# Patient Record
Sex: FEMALE | Race: BLACK OR AFRICAN AMERICAN | NOT HISPANIC OR LATINO | URBAN - METROPOLITAN AREA
[De-identification: names, ages, dates, MRNs, and addresses within clinical notes are randomized per-mention and may not be internally consistent; named-entity substitution may affect disease eponyms.]

---
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
## 2025-02-14 ENCOUNTER — OFFICE VISIT (OUTPATIENT)
Dept: OBGYN CLINIC | Age: 20
End: 2025-02-14
Payer: COMMERCIAL

## 2025-02-14 VITALS
BODY MASS INDEX: 19.63 KG/M2 | DIASTOLIC BLOOD PRESSURE: 62 MMHG | WEIGHT: 129.5 LBS | HEIGHT: 68 IN | SYSTOLIC BLOOD PRESSURE: 110 MMHG

## 2025-02-14 DIAGNOSIS — Z78.9 USES HORMONAL CONTRACEPTIVE PATCH AS PRIMARY BIRTH CONTROL METHOD: ICD-10-CM

## 2025-02-14 DIAGNOSIS — Z11.3 SCREEN FOR STD (SEXUALLY TRANSMITTED DISEASE): ICD-10-CM

## 2025-02-14 DIAGNOSIS — Z01.419 WELL WOMAN EXAM: Primary | ICD-10-CM

## 2025-02-14 DIAGNOSIS — Z12.4 SCREENING FOR CERVICAL CANCER: ICD-10-CM

## 2025-02-14 PROCEDURE — 99385 PREV VISIT NEW AGE 18-39: CPT | Performed by: NURSE PRACTITIONER

## 2025-02-14 RX ORDER — NORELGESTROMIN AND ETHINYL ESTRADIOL 35; 150 UG/MG; UG/MG
PATCH TRANSDERMAL
Qty: 3 PATCH | Refills: 4 | Status: SHIPPED | OUTPATIENT
Start: 2025-02-14

## 2025-02-14 RX ORDER — DOXYCYCLINE 100 MG/1
CAPSULE ORAL
COMMUNITY
End: 2025-02-14

## 2025-02-14 RX ORDER — AZITHROMYCIN 250 MG/1
TABLET, FILM COATED ORAL
COMMUNITY
Start: 2024-11-14 | End: 2025-02-14

## 2025-02-14 RX ORDER — IRON FUM,PS/FOLIC/BCOMP,C NO.9 125 MG-1MG
1 CAPSULE ORAL DAILY
COMMUNITY
Start: 2025-02-06

## 2025-02-14 NOTE — PROGRESS NOTES
ddThe patient is a 20 y.o. No obstetric history on file. who is seen for heavy vaginal bleeding.     Menses frequency:  Menses duration:  Menses heavy (if yes, how many pads/tampons on heaviest days)?  Menses painful (if yes, anything help the pain- ibuprofen, heating pads)?  Previous work up (ultrasound, labs, etc):  Previous treatments (OCPs, IUD, etc.), did they work?:'    HISTORY:    No obstetric history on file.  No LMP recorded.  Sexual History:  {Sexual Hx:5148}  Contraception:  {Contraception Methods:5051}  Current Outpatient Medications on File Prior to Visit   Medication Sig Dispense Refill    albuterol (PROVENTIL) (2.5 MG/3ML) 0.083% nebulizer solution USE 3 MLS IN NEBULIZER EVERY 4 TO 6 HOURS FOR 7 DAYS AS NEEDED FOR COUGHING/WHEEZING.      albuterol sulfate HFA (VENTOLIN HFA) 108 (90 Base) MCG/ACT inhaler INHALE 2 PUFFS EVERY 4 TO 6 HOURS AS NEEDED      Benzoyl Peroxide (BENZAC AC) 10 % external wash Wash face bid; dry x 20 minutes.      Calcium Carbonate Antacid 400 MG CHEW Take 400 mg by mouth 2 times daily      cetirizine (ZYRTEC) 10 MG tablet Take 10 mg by mouth daily      vitamin D3 (CHOLECALCIFEROL) 10 MCG (400 UNIT) TABS tablet Take by mouth daily      diphenhydrAMINE (BENADRYL) 12.5 MG/5ML elixir Take 25 mg by mouth every 6 hours as needed      EPINEPHrine (EPIPEN) 0.3 MG/0.3ML SOAJ injection INJECT 0.3 ML BY INTRAMUSCULAR ROUTE ONCE AS NEEDED FOR ANAPHYLAXIS FOR UP TO 1 DOSE.      ergocalciferol (ERGOCALCIFEROL) 1.25 MG (32142 UT) capsule Take 50,000 Units by mouth      erythromycin with ethanol (EMGEL) 2 % gel Apply topically 2 times daily      fluticasone (FLONASE) 50 MCG/ACT nasal spray 2 sprays by Nasal route daily      Melatonin 2.5 MG CHEW I tab 1 hour before bedtime.      minocycline (MINOCIN;DYNACIN) 100 MG capsule TAKE ONE CAPSULE BY MOUTH EVERY DAY WITH FOOD      naproxen sodium (ANAPROX) 550 MG tablet Take 550 mg by mouth 2 times daily (with meals)      propranolol (INDERAL) 10 MG 
sodium (ANAPROX) 550 MG tablet Take 550 mg by mouth 2 times daily (with meals)        propranolol (INDERAL) 10 MG tablet Take 10 mg by mouth 2 times daily        sertraline (ZOLOFT) 50 MG tablet Take 50 mg by mouth daily        tretinoin (RETIN-A) 0.05 % cream APPLY EVERY EVENING TO ACNE          No current facility-administered medications on file prior to visit.            ROS:  Feeling well. No dyspnea or chest pain on exertion.  No abdominal pain, change in bowel habits, black or bloody stools.  No urinary tract symptoms. GYN ROS: {gyn ros:732096}.     PHYSICAL EXAM:  There were no vitals taken for this visit.     The patient appears well, alert, oriented x 3, in no distress.  Lungs are clear. Heart RRR, no murmurs. Abdomen soft without tenderness, guarding, mass or organomegaly.  Pelvic: {pelvic exam:460132::\"normal external genitalia, vulva, vagina, cervix, uterus and adnexa\"}.     ASSESSMENT:  No diagnosis found.     PLAN:  {Gyn plan:70344}  No orders of the defined types were placed in this encounter.           Supervising physician is  ****.  *** minutes spent in chart review, exam, counseling and documentation.   
normal peripheral pulses.   Neurological is normal, no focal findings.    BREAST EXAM: breasts appear normal, no suspicious masses, no skin or nipple changes or axillary nodes, risk and benefit of breast self-exam was discussed    PELVIC EXAM: VULVA: normal appearing vulva with no masses, tenderness or lesions, VAGINA: normal appearing vagina with normal color and discharge, no lesions, CERVIX: normal appearing cervix without discharge or lesions, UTERUS: uterus is normal size, shape, consistency and nontender, ADNEXA: normal adnexa in size, nontender and no masses    Assessment/Plan:     1. Uses hormonal contraceptive patch as primary birth control method    - norelgestromin-ethinyl estradiol (XULANE) 150-35 MCG/24HR; Place 1 patch onto the skin once a week for 3 weeks, no patch on week 4 to have menses, resume patches after menses.  Dispense: 3 patch; Refill: 4    2. Well woman exam      3. Screening for cervical cancer    - PAP IG, CT-NG-TV, rfx Aptima HPV ASCUS (199325); Future  - PAP IG, CT-NG-TV, rfx Aptima HPV ASCUS (199325)    4. Screen for STD (sexually transmitted disease)    - PAP IG, CT-NG-TV, rfx Aptima HPV ASCUS (199325); Future  - PAP IG, CT-NG-TV, rfx Aptima HPV ASCUS (199325)           Start ortho evra patches  Counseled pt on OCP use. Discussed SE to include BTB, nausea, weight gain and risks to include VTE, stroke, cardiac event. Pt with no contraindications to OCPs.   pap smear  return annually or prn    Supervising physician is Dr. Gaona.    Janessa Borrero, APRN - CNP

## 2025-02-25 LAB
C TRACH RRNA CVX QL NAA+PROBE: NEGATIVE
COLLECTION METHOD: NORMAL
CYTOLOGIST CVX/VAG CYTO: NORMAL
CYTOLOGY CVX/VAG DOC THIN PREP: NORMAL
HPV REFLEX: NORMAL
Lab: NORMAL
N GONORRHOEA RRNA CVX QL NAA+PROBE: NEGATIVE
PAP SOURCE: NORMAL
PATH REPORT.FINAL DX SPEC: NORMAL
STAT OF ADQ CVX/VAG CYTO-IMP: NORMAL
T VAGINALIS RRNA SPEC QL NAA+PROBE: NEGATIVE

## 2025-04-08 NOTE — PROGRESS NOTES
Patient presents today for a routine gynecological examination with no complaints. Would like to start on OCP.  Pt reports her BP has been \"fluctuating\". She is followed by [facility] medicine for PCP for the above.    She has been on OCP in the past but states she had BTB on them.   Wishes to start ocp.   Will start POP.       OB History          0    Para   0    Term   0       0    AB   0    Living   0         SAB   0    IAB   0    Ectopic   0    Molar   0    Multiple   0    Live Births   0              Last pap: Never    GYN History           Patient's last menstrual period was 2025 (approximate). Cycle Length 28 Lasting 7  positive dysmenorrhea; negative postcoital bleeding    Past Medical History:  Past Medical History:   Diagnosis Date    Anxiety        Past Surgical History:  Past Surgical History:   Procedure Laterality Date    WISDOM TOOTH EXTRACTION         Allergies:   Allergies   Allergen Reactions    Amoxicillin-Pot Clavulanate Rash    Cefdinir Rash    Penicillins Hives and Rash       Medication History:  Current Outpatient Medications   Medication Sig Dispense Refill    albuterol sulfate  (90 Base) MCG/ACT inhaler Take 2 puffs q 4-6 hrs x 7 days prn cough/wheeze      albuterol (PROVENTIL) (2.5 MG/3ML) 0.083% nebulizer solution 3 ml q 4-6 hrs x 7 days prn for coughing/wheezing.      Benzoyl Peroxide (BENZAC AC) 10 % external wash Wash the face 2 times per day      Calcium Carbonate Antacid 400 MG CHEW Take 400 mg by mouth 2 times daily      cetirizine (ZYRTEC) 10 MG tablet Take 1 tablet by mouth daily      cromolyn (OPTICROM) 4 % ophthalmic solution Apply 1 drop to eye 4 times daily      diphenhydrAMINE (BANOPHEN) 12.5 MG/5ML liquid TAKE 5 ML BY MOUTH EVERY SIX (6) HOURS AS NEEDED (ANAPHYLAXIS).      EPINEPHrine (EPIPEN) 0.3 MG/0.3ML SOAJ injection 0.3 ML BY INTRAMUSCULAR ROUTE ONCE AS NEEDED FOR ANAPHYLAXIS FOR UP TO 1 DOSE.      ergocalciferol (ERGOCALCIFEROL) 1.25 MG

## 2025-04-09 ENCOUNTER — OFFICE VISIT (OUTPATIENT)
Dept: OBGYN CLINIC | Age: 20
End: 2025-04-09
Payer: COMMERCIAL

## 2025-04-09 VITALS
SYSTOLIC BLOOD PRESSURE: 130 MMHG | BODY MASS INDEX: 23.07 KG/M2 | WEIGHT: 147 LBS | HEIGHT: 67 IN | DIASTOLIC BLOOD PRESSURE: 80 MMHG

## 2025-04-09 DIAGNOSIS — Z12.4 SCREENING FOR CERVICAL CANCER: ICD-10-CM

## 2025-04-09 DIAGNOSIS — Z13.89 SCREENING FOR GENITOURINARY CONDITION: ICD-10-CM

## 2025-04-09 DIAGNOSIS — Z11.3 SCREEN FOR STD (SEXUALLY TRANSMITTED DISEASE): ICD-10-CM

## 2025-04-09 DIAGNOSIS — Z01.419 WELL WOMAN EXAM: Primary | ICD-10-CM

## 2025-04-09 LAB
BILIRUBIN, URINE, POC: NEGATIVE
BLOOD URINE, POC: NEGATIVE
GLUCOSE URINE, POC: NEGATIVE MG/DL
KETONES, URINE, POC: NEGATIVE MG/DL
LEUKOCYTE ESTERASE, URINE, POC: NEGATIVE
NITRITE, URINE, POC: NEGATIVE
PH, URINE, POC: 7.5 (ref 4.6–8)
PROTEIN,URINE, POC: NEGATIVE MG/DL
SPECIFIC GRAVITY, URINE, POC: 1.02 (ref 1–1.03)
URINALYSIS CLARITY, POC: NORMAL
URINALYSIS COLOR, POC: YELLOW
UROBILINOGEN, POC: NORMAL MG/DL

## 2025-04-09 PROCEDURE — 99385 PREV VISIT NEW AGE 18-39: CPT | Performed by: NURSE PRACTITIONER

## 2025-04-09 PROCEDURE — 81003 URINALYSIS AUTO W/O SCOPE: CPT | Performed by: NURSE PRACTITIONER

## 2025-04-09 RX ORDER — ACETAMINOPHEN AND CODEINE PHOSPHATE 120; 12 MG/5ML; MG/5ML
1 SOLUTION ORAL DAILY
Qty: 90 TABLET | Refills: 3 | Status: SHIPPED | OUTPATIENT
Start: 2025-04-09

## 2025-04-24 ENCOUNTER — RESULTS FOLLOW-UP (OUTPATIENT)
Dept: OBGYN CLINIC | Age: 20
End: 2025-04-24

## 2025-04-24 DIAGNOSIS — A74.9 CHLAMYDIA: Primary | ICD-10-CM

## 2025-04-24 LAB
C TRACH RRNA CVX QL NAA+PROBE: POSITIVE
COLLECTION METHOD: ABNORMAL
CYTOLOGIST CVX/VAG CYTO: ABNORMAL
CYTOLOGY CVX/VAG DOC THIN PREP: ABNORMAL
DATE OF LMP: ABNORMAL
HPV REFLEX: ABNORMAL
Lab: ABNORMAL
N GONORRHOEA RRNA CVX QL NAA+PROBE: NEGATIVE
PAP SOURCE: ABNORMAL
PATH REPORT.FINAL DX SPEC: ABNORMAL
PATHOLOGIST CVX/VAG CYTO: ABNORMAL
PATHOLOGIST PROVIDED ICD: ABNORMAL
STAT OF ADQ CVX/VAG CYTO-IMP: ABNORMAL
T VAGINALIS RRNA SPEC QL NAA+PROBE: NEGATIVE

## 2025-04-24 RX ORDER — DOXYCYCLINE HYCLATE 100 MG
100 TABLET ORAL 2 TIMES DAILY
Qty: 14 TABLET | Refills: 0 | Status: SHIPPED | OUTPATIENT
Start: 2025-04-24 | End: 2025-05-01

## 2025-04-24 NOTE — TELEPHONE ENCOUNTER
Phoned patient and reviewed + chlamydia, treatment with doxycyline. Reviewed that partner needs treatment as well. Recommended pt refrain from intercourse until both partners are treated or use condoms during intercourse while on antibiotics. Pt scheduled for  LEOBARDO in 6 weeks. Reviewed pap smear results and repeat pap smear in 1 year. Pt verbalized understanding; no further questions. Meds to pharmacy per orders.

## 2025-05-16 DIAGNOSIS — Z78.9 USES HORMONAL CONTRACEPTIVE PATCH AS PRIMARY BIRTH CONTROL METHOD: ICD-10-CM

## 2025-05-16 RX ORDER — NORELGESTROMIN AND ETHINYL ESTRADIOL 35; 150 UG/MG; UG/MG
PATCH TRANSDERMAL
Qty: 9 PATCH | Refills: 1 | OUTPATIENT
Start: 2025-05-16

## 2025-06-04 ENCOUNTER — TELEPHONE (OUTPATIENT)
Dept: OBGYN CLINIC | Age: 20
End: 2025-06-04

## 2025-06-04 NOTE — TELEPHONE ENCOUNTER
Pt called stating she has been bleeding x 2 weeks.   Patient has started a new birth control, only been on Micronor x 1 month.   On POP due to BP issues.   Pt reports bleeding varies from light to moderate flow.   Denies saturating through a pad every hour.   Has an upcoming appointment on Wednesday.   Recommend monitoring for now and follow up at upcoming appointment.   It is normal to have irregular bleeding patterns x 3-6 months when starting a new birth control. All questions were answered.

## 2025-06-11 ENCOUNTER — OFFICE VISIT (OUTPATIENT)
Dept: OBGYN CLINIC | Age: 20
End: 2025-06-11
Payer: COMMERCIAL

## 2025-06-11 VITALS
DIASTOLIC BLOOD PRESSURE: 80 MMHG | WEIGHT: 147.8 LBS | BODY MASS INDEX: 23.2 KG/M2 | SYSTOLIC BLOOD PRESSURE: 126 MMHG | HEIGHT: 67 IN

## 2025-06-11 DIAGNOSIS — Z30.09 ENCOUNTER FOR GENERAL COUNSELING AND ADVICE ON CONTRACEPTIVE MANAGEMENT: ICD-10-CM

## 2025-06-11 DIAGNOSIS — A74.9 CHLAMYDIAL INFECTION: Primary | ICD-10-CM

## 2025-06-11 DIAGNOSIS — Z11.3 SCREEN FOR STD (SEXUALLY TRANSMITTED DISEASE): ICD-10-CM

## 2025-06-11 PROCEDURE — 99214 OFFICE O/P EST MOD 30 MIN: CPT | Performed by: NURSE PRACTITIONER

## 2025-06-11 RX ORDER — NORELGESTROMIN AND ETHINYL ESTRADIOL 35; 150 UG/MG; UG/MG
PATCH TRANSDERMAL
Qty: 3 PATCH | Refills: 11 | Status: SHIPPED | OUTPATIENT
Start: 2025-06-11

## 2025-06-11 NOTE — PROGRESS NOTES
The patient is a 20 y.o.  who is seen for Chlamydia LEOBARDO.     24: +chlamydia on pap smear - tx'd w/ doxy 100mg BID x 7d    Pt completed abx?   Partner tx'd?   Symptoms?   Abstained from intercourse until tx'd?     HISTORY:      No LMP recorded.  Sexual History:  {Sexual Hx:5163}  Contraception:  {Contraception Methods:4661}  Current Outpatient Medications on File Prior to Visit   Medication Sig Dispense Refill    norethindrone (MICRONOR) 0.35 MG tablet Take 1 tablet by mouth daily 90 tablet 3    albuterol sulfate  (90 Base) MCG/ACT inhaler Take 2 puffs q 4-6 hrs x 7 days prn cough/wheeze      albuterol (PROVENTIL) (2.5 MG/3ML) 0.083% nebulizer solution 3 ml q 4-6 hrs x 7 days prn for coughing/wheezing.      Benzoyl Peroxide (BENZAC AC) 10 % external wash Wash the face 2 times per day      Calcium Carbonate Antacid 400 MG CHEW Take 400 mg by mouth 2 times daily      cetirizine (ZYRTEC) 10 MG tablet Take 1 tablet by mouth daily      cromolyn (OPTICROM) 4 % ophthalmic solution Apply 1 drop to eye 4 times daily      diphenhydrAMINE (BANOPHEN) 12.5 MG/5ML liquid TAKE 5 ML BY MOUTH EVERY SIX (6) HOURS AS NEEDED (ANAPHYLAXIS).      EPINEPHrine (EPIPEN) 0.3 MG/0.3ML SOAJ injection 0.3 ML BY INTRAMUSCULAR ROUTE ONCE AS NEEDED FOR ANAPHYLAXIS FOR UP TO 1 DOSE.      ergocalciferol (ERGOCALCIFEROL) 1.25 MG (10500 UT) capsule Take 1 capsule by mouth      erythromycin with ethanol (EMGEL) 2 % gel Apply topically 2 times daily      fluticasone (FLONASE) 50 MCG/ACT nasal spray 2 sprays by Nasal route daily      montelukast (SINGULAIR) 10 MG tablet Take 1 tablet by mouth      naproxen sodium (ANAPROX) 550 MG tablet Take 1 tablet by mouth 2 times daily (with meals)      propranolol (INDERAL) 10 MG tablet TAKE 1 TABLET BY MOUTH TWICE A DAY (Patient not taking: Reported on 2025)      raNITIdine (ZANTAC) 150 MG tablet Take 150 mg by mouth 2 times daily (Patient not taking: Reported on 2025)      
4/9/2025)      sertraline (ZOLOFT) 50 MG tablet Take 50 mg by mouth daily (Patient not taking: Reported on 4/9/2025)       No current facility-administered medications on file prior to visit.       ROS:  Feeling well. No dyspnea or chest pain on exertion.  No abdominal pain, change in bowel habits, black or bloody stools.  No urinary tract symptoms. GYN ROS: {gyn ros:515444}.    PHYSICAL EXAM:  There were no vitals taken for this visit.    The patient appears well, alert, oriented x 3, in no distress.  Lungs are clear. Heart RRR, no murmurs. Abdomen soft without tenderness, guarding, mass or organomegaly.  Pelvic: {pelvic exam:645854::\"normal external genitalia, vulva, vagina, cervix, uterus and adnexa\"}.    Female chaperone present for exam    ASSESSMENT:  Encounter Diagnoses   Name Primary?    Screen for STD (sexually transmitted disease)     Chlamydial infection Yes       PLAN:  {Gyn plan:49011}  No orders of the defined types were placed in this encounter.        Supervising physician is  ****.  *** minutes spent in chart review, exam, counseling and documentation.               
or enlarging lumps on self exam.    PHYSICAL EXAM:  Blood pressure 126/80, height 1.702 m (5' 7\"), weight 67 kg (147 lb 12.8 oz), last menstrual period 05/22/2025.    The patient appears well, alert, oriented x 3, in no distress.  Lungs are clear. Heart RRR, no murmurs. Abdomen soft without tenderness, guarding, mass or organomegaly.  Pelvic: VULVA: normal appearing vulva with no masses, tenderness or lesions, VAGINA: normal appearing vagina with normal color and discharge, no lesions, CERVIX: normal appearing cervix without discharge or lesions.    Chaperone present for exam.    ASSESSMENT:  Encounter Diagnoses   Name Primary?    Screen for STD (sexually transmitted disease)     Chlamydial infection Yes    Encounter for general counseling and advice on contraceptive management        PLAN:  All questions answered  Diagnosis explained in detail, including differential  Check LEOBARDO chlamydia  Safe sex practices.     Lengthy disc with pt on BTB and common nature on hormonal contraceptives, especially POP. Disc usually self limiting and will resolve spontaneously. She desires to switch to a different option.   Counseled on another pill vs patch, pt wishes to start patch.   Instructions for use reviewed, pt to start on first day of next period.   Disc BTB also a possibility with patch but should improve after 3mo of use.   Med recheck 4 mo    Orders Placed This Encounter   Procedures    Chlamydia, Gonorrhea, Trichomoniasis     Standing Status:   Future     Expected Date:   6/11/2025     Expiration Date:   6/11/2026         Supervising physician is Dr. Herrmann.

## 2025-06-15 LAB
C TRACH RRNA SPEC QL NAA+PROBE: NEGATIVE
N GONORRHOEA RRNA SPEC QL NAA+PROBE: NEGATIVE
SPECIMEN SOURCE: NORMAL
T VAGINALIS RRNA SPEC QL NAA+PROBE: NEGATIVE

## 2025-06-16 ENCOUNTER — RESULTS FOLLOW-UP (OUTPATIENT)
Dept: OBGYN CLINIC | Age: 20
End: 2025-06-16